# Patient Record
Sex: FEMALE | URBAN - NONMETROPOLITAN AREA
[De-identification: names, ages, dates, MRNs, and addresses within clinical notes are randomized per-mention and may not be internally consistent; named-entity substitution may affect disease eponyms.]

---

## 2021-07-21 ENCOUNTER — OFFICE VISIT (OUTPATIENT)
Dept: URBAN - NONMETROPOLITAN AREA CLINIC 13 | Facility: CLINIC | Age: 81
End: 2021-07-21

## 2021-07-21 NOTE — HPI-TODAY'S VISIT:
Low back pain, hypertension, hypogonadism, sciatic nerve pain, vitamin D deficiency. Former bobacco use presenting for colon cancer screening.